# Patient Record
Sex: MALE | Race: WHITE | NOT HISPANIC OR LATINO | Employment: OTHER | URBAN - METROPOLITAN AREA
[De-identification: names, ages, dates, MRNs, and addresses within clinical notes are randomized per-mention and may not be internally consistent; named-entity substitution may affect disease eponyms.]

---

## 2021-06-16 PROBLEM — L50.9 URTICARIA: Status: ACTIVE | Noted: 2019-06-03

## 2023-11-24 ENCOUNTER — HOSPITAL ENCOUNTER (EMERGENCY)
Facility: HOSPITAL | Age: 73
Discharge: HOME | End: 2023-11-24
Admitting: PHYSICIAN ASSISTANT
Payer: COMMERCIAL

## 2023-11-24 ENCOUNTER — APPOINTMENT (OUTPATIENT)
Dept: ULTRASOUND IMAGING | Facility: HOSPITAL | Age: 73
End: 2023-11-24
Payer: COMMERCIAL

## 2023-11-24 ENCOUNTER — APPOINTMENT (OUTPATIENT)
Dept: RADIOLOGY | Facility: HOSPITAL | Age: 73
End: 2023-11-24
Payer: COMMERCIAL

## 2023-11-24 VITALS
SYSTOLIC BLOOD PRESSURE: 164 MMHG | HEART RATE: 73 BPM | WEIGHT: 185 LBS | OXYGEN SATURATION: 97 % | TEMPERATURE: 98.4 F | DIASTOLIC BLOOD PRESSURE: 89 MMHG | BODY MASS INDEX: 29.73 KG/M2 | HEIGHT: 66 IN | RESPIRATION RATE: 20 BRPM

## 2023-11-24 DIAGNOSIS — M25.562 LEFT KNEE PAIN: ICD-10-CM

## 2023-11-24 DIAGNOSIS — M71.20 POPLITEAL CYST: ICD-10-CM

## 2023-11-24 PROCEDURE — 99284 EMERGENCY DEPT VISIT MOD MDM: CPT | Mod: 25

## 2023-11-24 PROCEDURE — 73562 X-RAY EXAM OF KNEE 3: CPT | Mod: LT

## 2023-11-24 PROCEDURE — 93971 EXTREMITY STUDY: CPT | Mod: LT

## 2023-11-24 ASSESSMENT — ACTIVITIES OF DAILY LIVING (ADL)
ADLS_ACUITY_SCORE: 33
ADLS_ACUITY_SCORE: 35

## 2023-11-25 NOTE — ED PROVIDER NOTES
ED PROVIDER NOTE    EMERGENCY DEPARTMENT ENCOUNTER      NAME: Lucho Peacock  AGE: 72 year old male  YOB: 1950  MRN: [id number]  EVALUATION DATE & TIME: 11/24/2023  6:51 PM    PCP: No Ref-Primary, Physician    ED PROVIDER: Terrie Peacock PA-C      Chief Complaint   Patient presents with    Knee Pain         FINAL IMPRESSION:  1. Left knee pain    2. Popliteal cyst          MEDICAL DECISION MAKING:    Pertinent Labs & Imaging studies reviewed. (See chart for details)  72 year old male with a h/o right knee arthritis presenting with cute onset of left knee pain that really progressed over the course of today.  No trauma.  No fevers.  Daughter is concerned for DVT.    Hypertensive here but otherwise vital stable.  Clinically appears well, nontoxic.  Has some mild swelling of the left knee as well as fullness and tenderness particularly over the popliteal space.  No joint line tenderness.  No calf tenderness.  No findings on exam to suggest septic joint.  I am less concerned with a DVT.  Symptoms seem most consistent with arthritis and potentially Baker's cyst.  X-ray and lower extremity ultrasound obtained.    X-rays with findings of a small effusion with degenerative arthritis.  Ultrasound without any DVT but with cystic areas in the popliteal space.    Discussed etiology of his symptoms being his underlying arthritis and likely these popliteal cyst.  We discussed symptomatic management and follow-up with orthopedics as he is already well-established for his right knee.    At the conclusion of the encounter I discussed the results of all of the tests and the disposition. The questions were answered. The patient or family acknowledged understanding and was agreeable with the care plan.       Medical Decision Making    History:  Supplemental history from: Documented in chart, if applicable  External Record(s) reviewed: Documented in chart, if applicable.    Work Up:  Chart documentation includes  differential considered and any EKGs or imaging independently interpreted by provider, where specified.  In additional to work up documented, I considered the following work up: Documented in chart, if applicable.    External consultation:  Discussion of management with another provider: Documented in chart, if applicable    Complicating factors:  Care impacted by chronic illness: N/A  Care affected by social determinants of health: N/A    Disposition considerations: Discharge. No recommendations on prescription strength medication(s). N/A.      MEDICATIONS GIVEN IN THE EMERGENCY:  Medications - No data to display    NEW PRESCRIPTIONS STARTED AT TODAY'S ER VISIT  New Prescriptions    No medications on file          =================================================================    HPI    Patient information was obtained from: Patient    Lucho Peacock is a 72 year old male with a history of arthritis who presents with left knee pain.  Started experiencing some general discomfort last night but today was walking around the mall and it started to stiffen off quite a bit.  He called his daughter who is an RN and and they told him to come to the ER with concerns of a blood clot.    His pain is primarily located over the superior lateral aspect and posterior knee with some fullness over the posterior knee.  He denies any numbness, weakness.  No trauma.  He has a history of arthritis in the right knee.  Takes Tylenol for that.  Took Tylenol just prior to arrival here so is not sure if that was helping or not at this time.    No fevers, chills, chest pain, shortness of breath.    Reviewed outside records:  11/20/23: Office visit with  orthopedics -diagnosed with osteoarthritis of right knee.  Had steroid injection.    REVIEW OF SYSTEMS   See HPI, otherwise all other systems reviewed and are negative    PAST MEDICAL HISTORY:  No past medical history on file.    PAST SURGICAL HISTORY:  No past surgical history on  "file.        CURRENT MEDICATIONS:    No current facility-administered medications for this encounter.  No current outpatient medications on file.    ALLERGIES:  No Known Allergies    FAMILY HISTORY:  No family history on file.      VITALS:  Vitals:    11/24/23 1845 11/24/23 1847   BP:  (!) 164/89   Pulse:  73   Resp:  20   Temp:  98.4  F (36.9  C)   SpO2:  97%   Weight: 83.9 kg (185 lb)    Height: 1.676 m (5' 6\")        PHYSICAL EXAM    General: Alert, orientated, no acute distress.  Appears stated age.  Head: Normocephalic, no signs of trauma.    Neck: Supple  Musculoskeletal: Mild swelling of the left knee with tenderness and fullness over the popliteal space.  No calf swelling or tenderness.  Normal sensation, pulses and cap refill in the leg.  Has full range of motion with flexion and extension.  Mccurney's test exacerbates his symptoms and there is a clicking palpated.  No redness, warmth.  Skin: Normal color, no rashes, abrasions or lacerations  Neuro: Alert and orientated appropriately.  Normal sensation and strength.  No focal deficits.  Pysch: Normal mood and affect.        LAB:  Labs Ordered and Resulted from Time of ED Arrival to Time of ED Departure - No data to display    RADIOLOGY:  US Lower Extremity Venous Duplex Left   Final Result   IMPRESSION:   1.  No deep venous thrombosis in the left lower extremity.      XR Knee Left 3 Views   Final Result   IMPRESSION: Normal joint alignment. No acute fracture. Small knee joint effusion. End-stage degenerative arthritis in the patellofemoral compartment with chronic bone-on-bone articulation. Minimal degenerative hypertrophic spurring in the medial and    lateral compartments.          Terrie Peacock PA-C   Emergency Medicine             Terrie Peacock PA-C  11/26/23 1046    "

## 2023-11-25 NOTE — ED TRIAGE NOTES
Pt presents to ED with 1 day of L knee pain. Last night went to bed with a sore knee, woke up today with more pain and swelling. Denies injury. Pain to anterior and posterior.

## 2023-11-25 NOTE — DISCHARGE INSTRUCTIONS
Your imaging studies do not show any acute findings.  Specifically there are no signs of DVT.  However your ultrasound does show popliteal cysts behind the left knee that can be a result of ongoing arthritis within the knee.  I would recommend continuing with Tylenol as you are already doing for the right knee.  Would recommend following up with orthopedics for ongoing management.

## 2025-01-02 ENCOUNTER — HOSPITAL ENCOUNTER (EMERGENCY)
Facility: HOSPITAL | Age: 75
Discharge: LEFT BEFORE DISCHARGE | End: 2025-01-02
Payer: MEDICARE

## 2025-01-02 VITALS
RESPIRATION RATE: 18 BRPM | DIASTOLIC BLOOD PRESSURE: 88 MMHG | OXYGEN SATURATION: 96 % | SYSTOLIC BLOOD PRESSURE: 172 MMHG | BODY MASS INDEX: 30.99 KG/M2 | TEMPERATURE: 98.3 F | HEART RATE: 68 BPM | WEIGHT: 192 LBS

## 2025-01-02 PROCEDURE — 99281 EMR DPT VST MAYX REQ PHY/QHP: CPT

## 2025-01-02 ASSESSMENT — COLUMBIA-SUICIDE SEVERITY RATING SCALE - C-SSRS
6. HAVE YOU EVER DONE ANYTHING, STARTED TO DO ANYTHING, OR PREPARED TO DO ANYTHING TO END YOUR LIFE?: NO
2. HAVE YOU ACTUALLY HAD ANY THOUGHTS OF KILLING YOURSELF IN THE PAST MONTH?: NO
1. IN THE PAST MONTH, HAVE YOU WISHED YOU WERE DEAD OR WISHED YOU COULD GO TO SLEEP AND NOT WAKE UP?: NO

## 2025-01-02 ASSESSMENT — ACTIVITIES OF DAILY LIVING (ADL): ADLS_ACUITY_SCORE: 41

## 2025-01-02 NOTE — ED TRIAGE NOTES
Patient arrives by private car for evaluation of right thumb laceration at approximately 1515 today.  Patient was using a mandolin and sliced his finger.

## 2025-01-03 ENCOUNTER — HOSPITAL ENCOUNTER (EMERGENCY)
Facility: HOSPITAL | Age: 75
Discharge: HOME | End: 2025-01-03
Admitting: PHYSICIAN ASSISTANT
Payer: MEDICARE

## 2025-01-03 VITALS
WEIGHT: 202 LBS | SYSTOLIC BLOOD PRESSURE: 145 MMHG | OXYGEN SATURATION: 97 % | RESPIRATION RATE: 20 BRPM | DIASTOLIC BLOOD PRESSURE: 86 MMHG | BODY MASS INDEX: 32.6 KG/M2 | TEMPERATURE: 98.5 F | HEART RATE: 74 BPM

## 2025-01-03 DIAGNOSIS — T14.8XXA SKIN AVULSION: ICD-10-CM

## 2025-01-03 PROCEDURE — 99283 EMERGENCY DEPT VISIT LOW MDM: CPT | Mod: 25

## 2025-01-03 PROCEDURE — 90471 IMMUNIZATION ADMIN: CPT | Performed by: PHYSICIAN ASSISTANT

## 2025-01-03 PROCEDURE — 272N000004 HC RX 272: Performed by: PHYSICIAN ASSISTANT

## 2025-01-03 PROCEDURE — 90715 TDAP VACCINE 7 YRS/> IM: CPT | Performed by: PHYSICIAN ASSISTANT

## 2025-01-03 PROCEDURE — 250N000011 HC RX IP 250 OP 636: Performed by: PHYSICIAN ASSISTANT

## 2025-01-03 RX ADMIN — GELATIN ABSORBABLE SPONGE 12-7 MM 1 EACH: 12-7 MISC at 10:06

## 2025-01-03 RX ADMIN — CLOSTRIDIUM TETANI TOXOID ANTIGEN (FORMALDEHYDE INACTIVATED), CORYNEBACTERIUM DIPHTHERIAE TOXOID ANTIGEN (FORMALDEHYDE INACTIVATED), BORDETELLA PERTUSSIS TOXOID ANTIGEN (GLUTARALDEHYDE INACTIVATED), BORDETELLA PERTUSSIS FILAMENTOUS HEMAGGLUTININ ANTIGEN (FORMALDEHYDE INACTIVATED), BORDETELLA PERTUSSIS PERTACTIN ANTIGEN, AND BORDETELLA PERTUSSIS FIMBRIAE 2/3 ANTIGEN 0.5 ML: 5; 2; 2.5; 5; 3; 5 INJECTION, SUSPENSION INTRAMUSCULAR at 10:03

## 2025-01-03 ASSESSMENT — COLUMBIA-SUICIDE SEVERITY RATING SCALE - C-SSRS
1. IN THE PAST MONTH, HAVE YOU WISHED YOU WERE DEAD OR WISHED YOU COULD GO TO SLEEP AND NOT WAKE UP?: NO
6. HAVE YOU EVER DONE ANYTHING, STARTED TO DO ANYTHING, OR PREPARED TO DO ANYTHING TO END YOUR LIFE?: NO
2. HAVE YOU ACTUALLY HAD ANY THOUGHTS OF KILLING YOURSELF IN THE PAST MONTH?: NO

## 2025-01-03 NOTE — DISCHARGE INSTRUCTIONS
You were seen in the ER for a skin injury on your right thumb.  The old glue was picked off, the wound was cleaned out, and Surgifoam applied to prevent further bleeding and to help the wound heal.  Recommendations: Keep the area clean and dry over the next couple of days.  Do not get it wet for at least 1 to 2 days.  Monitor for any signs or symptoms of infection.    Schedule with your primary care provider for follow up.    Return to the ER for any new or worsening symptoms.

## 2025-01-03 NOTE — ED TRIAGE NOTES
He sustained a laceration to his right thumb yesterday. [facility] and they told him to go to an ED yesterday.

## 2025-01-03 NOTE — ED PROVIDER NOTES
EMERGENCY DEPARTMENT ENCOUNTER   NAME: Lucho Peacock ; AGE: 74 year old male ; YOB: 1950 ; MRN: [id number] ; PCP: No Ref-Primary, Physician     Evaluation Date & Time: No admission date for patient encounter.    ED Provider: Lupe Joseph PA-C    CHIEF COMPLAINT     Laceration      FINAL ASSESSMENT       ICD-10-CM    1. Skin avulsion  T14.8XXA           ED COURSE, MEDICAL DECISION MAKING, PLAN     ED course/notable events     9:28 AM Evaluated patient.   9:54 AM Wound repair completed. Nurse to give Tdap and then discharge patient.   ______________________________________________________________________    Reason for visit: Lucho Peacock is a 74 year old male with HTN, aneurysm of ascending aorta without rupture, anemia, GERD, hiatal hernia presenting for a thumb injury that occurred yesterday on a mandolin.  Tried to come here, but we are very busy so he left.  [facility] and [facility] a bunch of glue on it, but it continues to bleed per patient.    Exam positives: Half centimeter skin avulsion injury on the right thumb on the distal end.  Very small amount of active bleeding present.    Vitals: Blood pressure elevated to 173/81, but otherwise vitally normal.  No suggestion of endorgan damage.    Labs: /    EKG: /    Imaging: /    Interventions/recheck: Dermabond was picked up, wound was washed out, and Surgifoam applied followed by a nonstick dressing and aluminum finger splint.  Patient was given a Tdap.    Consults: /    Assessment: Skin avulsion injury to the right thumb.  Hemostasis achieved.  Neurovascularly intact.  Range of motion intact.    Overall, findings not consistent with an acutely serious or life threatening condition that would necessitate hospitalization.     Plan: Recommended symptomatic cares including Acetaminophen and Ice/heat Ongoing wound cares for home discussed.    Indications for re-evaluation in the ER discussed.   Patient/parent/guardian understanding  and agreeable with the plan and will discharge to home in good condition.     -------------------------------------------------------------------------------------------------------------  *All pertinent lab & imaging studies independently reviewed. (See chart for details)   *Discussed the results of all the tests and plan with patient and family/guardians.     HISTORY OF PRESENT ILLNESS   Patient information was obtained from: Patient   Use of Intrepreter: None     Pertinent past medical history: HTN, aneurysm of ascending aorta without rupture, anemia, GERD, hiatal hernia    Lucho Peacock is here by means of walk in  with self for evaluation of a right thumb injury that occurred yesterday.    Patient states he was using a mandolin and accidentally sliced a large piece of skin off of the pad of his thumb.    States he came here, but it was too busy and he ended up leaving.  States [facility] and [facility] a [facility] on it, but it continued to bleed.    Last tetanus shot was in 2014.    No other concerns.    MEDICAL HISTORY     No past medical history on file.    No past surgical history on file.    No family history on file.         Medications   No current outpatient medications on file.        PHYSICAL EXAM     First Vitals:  Patient Vitals for the past 24 hrs:   BP Temp Temp src Pulse Resp SpO2 Weight   01/03/25 0908 (!) 173/81 98.5  F (36.9  C) Temporal 85 16 99 % 91.6 kg (202 lb)       PHYSICAL EXAM:   Constitutional: No acute distress.  Neuro: Awake and alert. No focal deficits.  Psych: Calm and cooperative.  Cardio: Regular rate. Adequate perfusion to extremities.   Pulmonary: Oxygenating well on RA. No labored breathing.      Upper extremities: Moves freely. No edema. Distal pulses intact. Sensations intact.    Skin: Half centimeter skin avulsion injury to the distal pad of the right thumb.  Some crusted blood/glue remnants around the open wound, but none on the actual wound.  Very small amount  of active bleeding.  Depth is superficial.  All other skin appears natural color, warm, dry, intact.         RESULTS     LAB:  All pertinent labs reviewed and interpreted  Labs Ordered and Resulted from Time of ED Arrival to Time of ED Departure - No data to display    RADIOLOGY:  No orders to display       PROCEDURES     None           MEDICATIONS GIVEN IN THE EMERGENCY DEPARTMENT:  Medications   gelatin absorbable (GELFOAM) sponge 1 each (has no administration in time range)   Tdap (tetanus-diphtheria-acell pertussis) (ADACEL) injection 0.5 mL (has no administration in time range)       NEW PRESCRIPTIONS STARTED AT TODAY'S ED VISIT:  New Prescriptions    No medications on file            MEDICAL DECISION MAKING:  Medical Decision Making  Obtained supplemental history:Supplemental history obtained?: No  Reviewed external records: External records reviewed?: No  Care impacted by chronic illness:Documented in Chart  Did you consider but not order tests?: Work up considered but not performed and documented in chart, if applicable  Did you interpret images independently?: Independent interpretation of ECG and images noted in documentation, when applicable.  Consultation discussion with other provider:Did you involve another provider (consultant, , pharmacy, etc.)?: No  Discharge. No recommendations on prescription strength medication(s). See documentation for any additional details.    MIPS: Not Applicable      CRITICAL CARE:  N/A           SCRIBE ATTESTATION  N/A      Some or all of this documentation has been completed using dictation software and grammatical errors may be present. Please contact me with any concerns regarding this.     Lupe Joseph PA-C  Emergency Medicine   [facility] EMERGENCY DEPARTMENT       Lupe Joseph PA-C  01/03/25 1000